# Patient Record
Sex: MALE | Race: OTHER | Employment: UNEMPLOYED | URBAN - METROPOLITAN AREA
[De-identification: names, ages, dates, MRNs, and addresses within clinical notes are randomized per-mention and may not be internally consistent; named-entity substitution may affect disease eponyms.]

---

## 2020-10-05 ENCOUNTER — TELEPHONE (OUTPATIENT)
Dept: INTERNAL MEDICINE CLINIC | Facility: CLINIC | Age: 35
End: 2020-10-05

## 2020-10-05 NOTE — TELEPHONE ENCOUNTER
Pt stated that he test postive for Covid -19 Sep 8. Pt was in the hospital. Pt was discharged home and he will like to f/u as he still has a cough and slight sob.  Also because at the hospital they informed pt he has a hernia below his belly button

## 2020-10-05 NOTE — TELEPHONE ENCOUNTER
I think my 3p for tomorrow has been taken, pls try booking with other provide or can fu with me Thursday if slots open.

## 2020-10-05 NOTE — TELEPHONE ENCOUNTER
(new pt) Pt stated that he test postive for Covid -19 Sep 8. Pt was admitted at [facility] (formerly [facility]) for 6 days. Pt was discharged home and he will like to f/u as he still has a cough and slight sob.  Also because at the hospital they informed pt he

## 2020-10-06 ENCOUNTER — APPOINTMENT (OUTPATIENT)
Dept: LAB | Age: 35
End: 2020-10-06
Attending: INTERNAL MEDICINE
Payer: MEDICAID

## 2020-10-06 ENCOUNTER — HOSPITAL ENCOUNTER (OUTPATIENT)
Dept: GENERAL RADIOLOGY | Age: 35
Discharge: HOME | End: 2020-10-06
Attending: INTERNAL MEDICINE
Payer: MEDICAID

## 2020-10-06 ENCOUNTER — OFFICE VISIT (OUTPATIENT)
Dept: INTERNAL MEDICINE CLINIC | Facility: CLINIC | Age: 35
End: 2020-10-06
Payer: MEDICAID

## 2020-10-06 VITALS
HEART RATE: 83 BPM | DIASTOLIC BLOOD PRESSURE: 77 MMHG | HEIGHT: 65 IN | WEIGHT: 315 LBS | SYSTOLIC BLOOD PRESSURE: 110 MMHG | BODY MASS INDEX: 52.48 KG/M2 | TEMPERATURE: 98 F

## 2020-10-06 DIAGNOSIS — R06.02 SHORTNESS OF BREATH: ICD-10-CM

## 2020-10-06 DIAGNOSIS — U07.1 COVID-19 VIRUS INFECTION: Primary | ICD-10-CM

## 2020-10-06 DIAGNOSIS — R07.89 CHEST PRESSURE: ICD-10-CM

## 2020-10-06 DIAGNOSIS — R05.9 COUGH: ICD-10-CM

## 2020-10-06 DIAGNOSIS — U07.1 COVID-19 VIRUS INFECTION: ICD-10-CM

## 2020-10-06 PROCEDURE — 93010 ELECTROCARDIOGRAM REPORT: CPT | Performed by: INTERNAL MEDICINE

## 2020-10-06 PROCEDURE — 1111F DSCHRG MED/CURRENT MED MERGE: CPT | Performed by: INTERNAL MEDICINE

## 2020-10-06 PROCEDURE — 93005 ELECTROCARDIOGRAM TRACING: CPT

## 2020-10-06 PROCEDURE — 71046 X-RAY EXAM CHEST 2 VIEWS: CPT | Performed by: INTERNAL MEDICINE

## 2020-10-06 PROCEDURE — 3074F SYST BP LT 130 MM HG: CPT | Performed by: INTERNAL MEDICINE

## 2020-10-06 PROCEDURE — 3078F DIAST BP <80 MM HG: CPT | Performed by: INTERNAL MEDICINE

## 2020-10-06 PROCEDURE — 99203 OFFICE O/P NEW LOW 30 MIN: CPT | Performed by: INTERNAL MEDICINE

## 2020-10-06 PROCEDURE — 3008F BODY MASS INDEX DOCD: CPT | Performed by: INTERNAL MEDICINE

## 2020-10-06 RX ORDER — BENZONATATE 100 MG/1
100 CAPSULE ORAL 3 TIMES DAILY PRN
Qty: 30 CAPSULE | Refills: 1 | Status: SHIPPED | OUTPATIENT
Start: 2020-10-06

## 2020-10-06 NOTE — TELEPHONE ENCOUNTER
ALEXA Ospina pt send us his neg results. Pls see medica tab. t was called and informed of Dr. Sharla Victoria message below. Pt will send us the neg result through [employer]  Verified and pt is negative for covid-19.      Future Appointments   Date Time Provider Depa

## 2020-10-06 NOTE — PROGRESS NOTES
Patient ID: Tammi Neal is a 37year old male. Patient presents with:  Hospital F/U: F/u for positive COVID-19 test. Per patient positive 9/9/2020. Per patient got tested 3 times last week all negative for COVID-19.         HISTORY OF PRESENT ILLNESS: Not on file      Food insecurity        Worry: Not on file        Inability: Not on file      Transportation needs        Medical: Not on file        Non-medical: Not on file    Tobacco Use      Smoking status: Never Smoker      Smokeless tobacco: Never Us steroids. · Has tested negative since. 2. Shortness of breath  · XR CHEST PA + LAT CHEST (CPT=71046); Future    3. Cough  · XR CHEST PA + LAT CHEST (CPT=71046); Future  · benzonatate 100 MG Oral Cap;  Take 1 capsule (100 mg total) by mouth 3 (three) gurvinder

## 2020-10-06 NOTE — TELEPHONE ENCOUNTER
Make sure we have confirmed test results that are negative for COVID-19. Care Everywhere only shows his positive test from 9/17 and 9/18. If we don't receive the negative tests then he cannot be seen in the office.

## 2020-10-07 ENCOUNTER — PATIENT MESSAGE (OUTPATIENT)
Dept: INTERNAL MEDICINE CLINIC | Facility: CLINIC | Age: 35
End: 2020-10-07

## 2020-11-01 ENCOUNTER — APPOINTMENT (OUTPATIENT)
Dept: CT IMAGING | Facility: HOSPITAL | Age: 35
End: 2020-11-01
Attending: EMERGENCY MEDICINE
Payer: MEDICAID

## 2020-11-01 ENCOUNTER — HOSPITAL ENCOUNTER (EMERGENCY)
Facility: HOSPITAL | Age: 35
Discharge: HOME | End: 2020-11-01
Attending: EMERGENCY MEDICINE
Payer: MEDICAID

## 2020-11-01 VITALS
TEMPERATURE: 97 F | RESPIRATION RATE: 18 BRPM | DIASTOLIC BLOOD PRESSURE: 82 MMHG | HEART RATE: 70 BPM | OXYGEN SATURATION: 100 % | SYSTOLIC BLOOD PRESSURE: 126 MMHG

## 2020-11-01 DIAGNOSIS — K43.9 VENTRAL HERNIA WITHOUT OBSTRUCTION OR GANGRENE: Primary | ICD-10-CM

## 2020-11-01 PROCEDURE — 85025 COMPLETE CBC W/AUTO DIFF WBC: CPT | Performed by: EMERGENCY MEDICINE

## 2020-11-01 PROCEDURE — 96375 TX/PRO/DX INJ NEW DRUG ADDON: CPT

## 2020-11-01 PROCEDURE — 81001 URINALYSIS AUTO W/SCOPE: CPT | Performed by: EMERGENCY MEDICINE

## 2020-11-01 PROCEDURE — 80048 BASIC METABOLIC PNL TOTAL CA: CPT | Performed by: EMERGENCY MEDICINE

## 2020-11-01 PROCEDURE — 99284 EMERGENCY DEPT VISIT MOD MDM: CPT

## 2020-11-01 PROCEDURE — 96374 THER/PROPH/DIAG INJ IV PUSH: CPT

## 2020-11-01 PROCEDURE — 74177 CT ABD & PELVIS W/CONTRAST: CPT | Performed by: EMERGENCY MEDICINE

## 2020-11-01 RX ORDER — ONDANSETRON 2 MG/ML
4 INJECTION INTRAMUSCULAR; INTRAVENOUS ONCE
Status: COMPLETED | OUTPATIENT
Start: 2020-11-01 | End: 2020-11-01

## 2020-11-01 RX ORDER — MORPHINE SULFATE 4 MG/ML
4 INJECTION, SOLUTION INTRAMUSCULAR; INTRAVENOUS ONCE
Status: COMPLETED | OUTPATIENT
Start: 2020-11-01 | End: 2020-11-01

## 2020-11-02 NOTE — ED INITIAL ASSESSMENT (HPI)
S: Abdominal pain and vomiting.  B: Patient presents to ed with vomiting and severe abdominal pain x 2h. Recent hx of + COVID. Patient has pinpoint tenderness. Denies fevers. A: Pale, unwell appearing, diaphoretic.

## 2020-11-02 NOTE — ED PROVIDER NOTES
Patient Seen in: [facility] AND [facility] Emergency Department      History   Patient presents with:  Nausea/vomiting    Stated Complaint: NVD x 2 hours    HPI    Patient is a 24-year-old male he states he has been told he had a ventral hernia.   Last 2 hours he and no guarding. Musculoskeletal: Normal range of motion. Exhibits no edema or tenderness. Lymphadenopathy: No cervical adenopathy. Neurological: Alert and oriented to person, place, and time. Normal reflexes. No cranial nerve deficit.  No motor os se PM                Check patient states his pain is gone he feels well his abdomen is soft and nontender. I do not feel the hernia. Long discussion on importance of close outpatient follow-up he understands to return for abdominal pain or vomiting.

## 2021-11-16 NOTE — TELEPHONE ENCOUNTER
From: [facility]  To:  Rajesh Nolan MD  Sent: 10/7/2020 3:18 PM CDT  Subject: Test Results Question    I have a question about X-Ray Chest resulted on 10/6/20, 2:26 PM.    Dr Xavier Stone ,     Thank you for reaching out, I have picked up my medicine and starte
Please see x-ray result notes from 10/7/20
independent